# Patient Record
Sex: MALE | Race: WHITE | URBAN - METROPOLITAN AREA
[De-identification: names, ages, dates, MRNs, and addresses within clinical notes are randomized per-mention and may not be internally consistent; named-entity substitution may affect disease eponyms.]

---

## 2019-06-12 ENCOUNTER — HOSPITAL ENCOUNTER (OUTPATIENT)
Dept: INFUSION THERAPY | Age: 69
Discharge: HOME | End: 2019-06-12
Payer: MEDICARE

## 2019-06-12 VITALS
RESPIRATION RATE: 18 BRPM | TEMPERATURE: 97.6 F | HEART RATE: 83 BPM | DIASTOLIC BLOOD PRESSURE: 73 MMHG | SYSTOLIC BLOOD PRESSURE: 169 MMHG

## 2019-06-12 PROCEDURE — 74011250636 HC RX REV CODE- 250/636: Performed by: NURSE PRACTITIONER

## 2019-06-12 PROCEDURE — 96365 THER/PROPH/DIAG IV INF INIT: CPT

## 2019-06-12 PROCEDURE — 74011000258 HC RX REV CODE- 258: Performed by: NURSE PRACTITIONER

## 2019-06-12 PROCEDURE — 74011250636 HC RX REV CODE- 250/636

## 2019-06-12 RX ORDER — HEPARIN 100 UNIT/ML
500 SYRINGE INTRAVENOUS AS NEEDED
Status: DISCONTINUED | OUTPATIENT
Start: 2019-06-12 | End: 2019-06-13 | Stop reason: HOSPADM

## 2019-06-12 RX ORDER — AMLODIPINE BESYLATE 5 MG/1
5 TABLET ORAL DAILY
COMMUNITY

## 2019-06-12 RX ORDER — SIMVASTATIN 20 MG/1
TABLET, FILM COATED ORAL
COMMUNITY

## 2019-06-12 RX ORDER — SODIUM CHLORIDE 0.9 % (FLUSH) 0.9 %
10 SYRINGE (ML) INJECTION AS NEEDED
Status: DISCONTINUED | OUTPATIENT
Start: 2019-06-12 | End: 2019-06-13 | Stop reason: HOSPADM

## 2019-06-12 RX ORDER — CYCLOSPORINE 100 MG/1
CAPSULE, GELATIN COATED ORAL 3 TIMES DAILY
COMMUNITY

## 2019-06-12 RX ORDER — IBUPROFEN 200 MG
630 CAPSULE ORAL DAILY
COMMUNITY

## 2019-06-12 RX ORDER — MYCOPHENOLATE MOFETIL 500 MG/1
750 TABLET ORAL 2 TIMES DAILY
COMMUNITY

## 2019-06-12 RX ORDER — ALLOPURINOL 300 MG/1
TABLET ORAL DAILY
COMMUNITY

## 2019-06-12 RX ORDER — LEVOTHYROXINE SODIUM 137 UG/1
TABLET ORAL
COMMUNITY

## 2019-06-12 RX ORDER — PREDNISONE 5 MG/1
2.5 TABLET ORAL 3 TIMES DAILY
COMMUNITY

## 2019-06-12 RX ORDER — ZINC GLUCONATE 10 MG
400 LOZENGE ORAL
COMMUNITY

## 2019-06-12 RX ORDER — ATENOLOL 100 MG/1
100 TABLET ORAL DAILY
COMMUNITY

## 2019-06-12 RX ADMIN — SODIUM CHLORIDE 1 G: 900 INJECTION, SOLUTION INTRAVENOUS at 12:03

## 2019-06-12 RX ADMIN — Medication 10 ML: at 12:04

## 2019-06-12 RX ADMIN — Medication 10 ML: at 12:35

## 2019-06-12 RX ADMIN — Medication 500 UNITS: at 12:04

## 2019-06-12 RX ADMIN — SODIUM CHLORIDE, PRESERVATIVE FREE 500 UNITS: 5 INJECTION INTRAVENOUS at 12:35

## 2019-06-12 NOTE — PROGRESS NOTES
[facility] Progress Note    Date: 2019    Name: Trisha Vásquez    MRN: [id number]         : 1950    Mr. Vaishali Smith Arrived ambulatory and in no distress for Invanz regimen. Assessment was completed, no acute issues at this time, no new complaints voiced. PICC line flushed, purple and red lumen, + blood return. Mr. Chiki Davis vitals were reviewed. Patient Vitals for the past 12 hrs:   Temp Pulse Resp BP   19 1136 97.6 °F (36.4 °C) 83 18 169/73     Medications:    Mr. Vaishali Smith tolerated treatment well and was discharged from [place] [address] in stable condition. PICC line flushed & heparinized per protocol. He is to return on 19 at 1:00 for his next appointment.     James Vidal RN  2019

## 2019-06-13 ENCOUNTER — HOSPITAL ENCOUNTER (OUTPATIENT)
Dept: INFUSION THERAPY | Age: 69
Discharge: HOME | End: 2019-06-13
Payer: MEDICARE

## 2019-06-13 VITALS
DIASTOLIC BLOOD PRESSURE: 65 MMHG | SYSTOLIC BLOOD PRESSURE: 139 MMHG | TEMPERATURE: 98 F | HEART RATE: 98 BPM | RESPIRATION RATE: 16 BRPM

## 2019-06-13 PROCEDURE — 96365 THER/PROPH/DIAG IV INF INIT: CPT

## 2019-06-13 PROCEDURE — 74011250636 HC RX REV CODE- 250/636: Performed by: INTERNAL MEDICINE

## 2019-06-13 PROCEDURE — 74011000258 HC RX REV CODE- 258: Performed by: NURSE PRACTITIONER

## 2019-06-13 PROCEDURE — 74011250636 HC RX REV CODE- 250/636: Performed by: NURSE PRACTITIONER

## 2019-06-13 RX ORDER — HEPARIN 100 UNIT/ML
500 SYRINGE INTRAVENOUS AS NEEDED
Status: DISCONTINUED | OUTPATIENT
Start: 2019-06-13 | End: 2019-06-14 | Stop reason: HOSPADM

## 2019-06-13 RX ORDER — SODIUM CHLORIDE 0.9 % (FLUSH) 0.9 %
5-10 SYRINGE (ML) INJECTION AS NEEDED
Status: DISCONTINUED | OUTPATIENT
Start: 2019-06-13 | End: 2019-06-14 | Stop reason: HOSPADM

## 2019-06-13 RX ADMIN — Medication 10 ML: at 13:30

## 2019-06-13 RX ADMIN — Medication 10 ML: at 13:31

## 2019-06-13 RX ADMIN — Medication 500 UNITS: at 13:30

## 2019-06-13 RX ADMIN — Medication 500 UNITS: at 13:31

## 2019-06-13 RX ADMIN — SODIUM CHLORIDE 1 G: 900 INJECTION, SOLUTION INTRAVENOUS at 13:00

## 2019-06-13 NOTE — PROGRESS NOTES
Outpatient Infusion Center Progress Note    8616  Pt admit to [facility] [place] for Ertapenem regimen ambulatory in stable condition. Assessment completed. No new concerns voiced. PICC line flushed, purple and red lumen and positive blood return noted. Visit Vitals  /65   Pulse 98   Temp 98 °F (36.7 °C)   Resp 16     Medications Administered     ertapenem (INVANZ) 1 g in 0.9% sodium chloride (MBP/ADV) 50 mL     Admin Date  06/13/2019 Action  New Bag Dose  1 g Rate  100 mL/hr Route  IntraVENous Administered By  Ciara Broderick RN          heparin (porcine) pf 500 Units     Admin Date  06/13/2019 Action  Given Dose  500 Units Route  IntraVENous Administered By  Ciara Broderick RN           Admin Date  06/13/2019 Action  Given Dose  500 Units Route  IntraVENous Administered By  Ciara Broderick RN          sodium chloride (NS) flush 5-10 mL     Admin Date  06/13/2019 Action  Given Dose  10 mL Route  IntraVENous Administered By  Ciara Broderick RN           Admin Date  06/13/2019 Action  Given Dose  10 mL Route  IntraVENous Administered By  Ciara Broderick RN                    3137 Pt tolerated treatment well. D/c home ambulatory in no distress. PICC line flushed with saline and heparin per protocol and curos caps applied.  Pt aware of next appointment scheduled for 6/14/19 at 3pm.

## 2019-06-14 ENCOUNTER — HOSPITAL ENCOUNTER (OUTPATIENT)
Dept: INFUSION THERAPY | Age: 69
Discharge: HOME | End: 2019-06-14
Payer: MEDICARE

## 2019-06-14 VITALS
DIASTOLIC BLOOD PRESSURE: 65 MMHG | RESPIRATION RATE: 16 BRPM | TEMPERATURE: 98.1 F | HEART RATE: 107 BPM | SYSTOLIC BLOOD PRESSURE: 141 MMHG

## 2019-06-14 PROCEDURE — 74011250636 HC RX REV CODE- 250/636: Performed by: NURSE PRACTITIONER

## 2019-06-14 PROCEDURE — 96365 THER/PROPH/DIAG IV INF INIT: CPT

## 2019-06-14 PROCEDURE — 77030020847 HC STATLOK BARD -A

## 2019-06-14 PROCEDURE — 74011000258 HC RX REV CODE- 258: Performed by: NURSE PRACTITIONER

## 2019-06-14 RX ORDER — SODIUM CHLORIDE 0.9 % (FLUSH) 0.9 %
5-10 SYRINGE (ML) INJECTION AS NEEDED
Status: DISCONTINUED | OUTPATIENT
Start: 2019-06-14 | End: 2019-06-15 | Stop reason: HOSPADM

## 2019-06-14 RX ORDER — HEPARIN 100 UNIT/ML
500 SYRINGE INTRAVENOUS AS NEEDED
Status: DISCONTINUED | OUTPATIENT
Start: 2019-06-14 | End: 2019-06-15 | Stop reason: HOSPADM

## 2019-06-14 RX ADMIN — Medication 500 UNITS: at 15:07

## 2019-06-14 RX ADMIN — Medication 10 ML: at 14:32

## 2019-06-14 RX ADMIN — Medication 10 ML: at 15:07

## 2019-06-14 RX ADMIN — SODIUM CHLORIDE 1 G: 900 INJECTION, SOLUTION INTRAVENOUS at 14:36

## 2019-06-14 NOTE — PROGRESS NOTES
Outpatient Infusion Center - Chemotherapy Progress Note    1430 Pt admit to [facility] for Ertapenem regimen/PICC line dressing change ambulatory in stable condition. Patient complains of slight nausea with dinner last night. Right arm PICC line accessed with positive blood return. Ertapenem running through purple lumen. Patient Vitals for the past 12 hrs:   Temp Pulse Resp BP   06/14/19 1431 98.1 °F (36.7 °C) (!) 107 16 141/65       Medications:  IV Ertapenem    1520 Pt tolerated treatment well. Right arm PICC line maintained positive blood return throughout treatment, flushed with saline and heparin per protocol. PICC dressing changed. D/c home ambulatory in no distress. Pt aware of next appointment scheduled for 6/15/19 at [address] [place] [address] at [address].

## 2019-06-15 ENCOUNTER — HOSPITAL ENCOUNTER (OUTPATIENT)
Dept: INFUSION THERAPY | Age: 69
Discharge: HOME | End: 2019-06-15
Payer: MEDICARE

## 2019-06-15 VITALS
HEART RATE: 112 BPM | SYSTOLIC BLOOD PRESSURE: 118 MMHG | RESPIRATION RATE: 16 BRPM | TEMPERATURE: 97.6 F | OXYGEN SATURATION: 100 % | DIASTOLIC BLOOD PRESSURE: 60 MMHG

## 2019-06-15 PROCEDURE — 74011000258 HC RX REV CODE- 258: Performed by: NURSE PRACTITIONER

## 2019-06-15 PROCEDURE — 74011250636 HC RX REV CODE- 250/636

## 2019-06-15 PROCEDURE — 96365 THER/PROPH/DIAG IV INF INIT: CPT

## 2019-06-15 PROCEDURE — 74011250636 HC RX REV CODE- 250/636: Performed by: NURSE PRACTITIONER

## 2019-06-15 RX ORDER — HEPARIN 100 UNIT/ML
500 SYRINGE INTRAVENOUS AS NEEDED
Status: DISCONTINUED | OUTPATIENT
Start: 2019-06-15 | End: 2019-06-16 | Stop reason: HOSPADM

## 2019-06-15 RX ORDER — SODIUM CHLORIDE 0.9 % (FLUSH) 0.9 %
10-40 SYRINGE (ML) INJECTION AS NEEDED
Status: DISCONTINUED | OUTPATIENT
Start: 2019-06-15 | End: 2019-06-16 | Stop reason: HOSPADM

## 2019-06-15 RX ADMIN — SODIUM CHLORIDE 1 G: 900 INJECTION, SOLUTION INTRAVENOUS at 08:51

## 2019-06-15 RX ADMIN — Medication 500 UNITS: at 09:25

## 2019-06-15 RX ADMIN — Medication 10 ML: at 09:25

## 2019-06-15 NOTE — PROGRESS NOTES
0845: Pt arrived at [facility] ambulatory and in no distress for Invanz 1g IV. Assessment completed, no new complaints voiced. Right double lumen picc with + blood return. Visit Vitals  /60   Pulse (!) 112   Temp 97.6 °F (36.4 °C)   Resp 16   SpO2 100%       Medications received:  Invanz 1g IV over 30 mins    0930 Tolerated treatment well, no adverse reaction noted. D/Cd from [facility] ambulatory and in no distress accompanied by wife.   Next appt 9/16/19 at 830am

## 2019-06-16 ENCOUNTER — HOSPITAL ENCOUNTER (OUTPATIENT)
Dept: INFUSION THERAPY | Age: 69
Discharge: HOME | End: 2019-06-16
Payer: MEDICARE

## 2019-06-16 VITALS
DIASTOLIC BLOOD PRESSURE: 66 MMHG | HEART RATE: 80 BPM | SYSTOLIC BLOOD PRESSURE: 128 MMHG | TEMPERATURE: 97.1 F | RESPIRATION RATE: 18 BRPM | OXYGEN SATURATION: 100 %

## 2019-06-16 PROCEDURE — 74011250636 HC RX REV CODE- 250/636

## 2019-06-16 PROCEDURE — 96365 THER/PROPH/DIAG IV INF INIT: CPT

## 2019-06-16 PROCEDURE — 74011250636 HC RX REV CODE- 250/636: Performed by: NURSE PRACTITIONER

## 2019-06-16 PROCEDURE — 74011000258 HC RX REV CODE- 258: Performed by: NURSE PRACTITIONER

## 2019-06-16 RX ORDER — SODIUM CHLORIDE 0.9 % (FLUSH) 0.9 %
5-10 SYRINGE (ML) INJECTION AS NEEDED
Status: DISCONTINUED | OUTPATIENT
Start: 2019-06-16 | End: 2019-06-17 | Stop reason: HOSPADM

## 2019-06-16 RX ORDER — HEPARIN 100 UNIT/ML
500 SYRINGE INTRAVENOUS AS NEEDED
Status: DISCONTINUED | OUTPATIENT
Start: 2019-06-16 | End: 2019-06-17 | Stop reason: HOSPADM

## 2019-06-16 RX ADMIN — SODIUM CHLORIDE, PRESERVATIVE FREE 500 UNITS: 5 INJECTION INTRAVENOUS at 08:28

## 2019-06-16 RX ADMIN — SODIUM CHLORIDE 1 G: 900 INJECTION, SOLUTION INTRAVENOUS at 08:25

## 2019-06-16 RX ADMIN — Medication 10 ML: at 08:28

## 2019-06-16 RX ADMIN — Medication 10 ML: at 08:25

## 2019-06-16 RX ADMIN — Medication 10 ML: at 08:56

## 2019-06-16 RX ADMIN — Medication 10 ML: at 08:55

## 2019-06-16 RX ADMIN — SODIUM CHLORIDE, PRESERVATIVE FREE 500 UNITS: 5 INJECTION INTRAVENOUS at 08:56

## 2019-06-16 NOTE — PROGRESS NOTES
Outpatient Infusion Center Progress Note    0820  Pt admit to [facility] for daily antibiotics ambulatory in stable condition. Assessment completed. No new concerns voiced. Visit Vitals  /66   Pulse 80   Temp 97.1 °F (36.2 °C)   Resp 18   SpO2 100%     Right arm DL PICC line flushed with positive blood return in each lumen. Medications:  Invanz IV    0900  Pt tolerated treatment well. PICC flushed and secured for use at tomorrow's appt. D/c home ambulatory in no distress.  Pt aware of next appointment scheduled for 6/17/19 at 1 pm.

## 2019-06-17 ENCOUNTER — HOSPITAL ENCOUNTER (OUTPATIENT)
Dept: INFUSION THERAPY | Age: 69
Discharge: HOME | End: 2019-06-17
Payer: MEDICARE

## 2019-06-17 VITALS
TEMPERATURE: 98 F | SYSTOLIC BLOOD PRESSURE: 128 MMHG | DIASTOLIC BLOOD PRESSURE: 59 MMHG | HEART RATE: 76 BPM | RESPIRATION RATE: 18 BRPM

## 2019-06-17 PROCEDURE — 96365 THER/PROPH/DIAG IV INF INIT: CPT

## 2019-06-17 PROCEDURE — 74011250636 HC RX REV CODE- 250/636: Performed by: NURSE PRACTITIONER

## 2019-06-17 PROCEDURE — 74011000258 HC RX REV CODE- 258: Performed by: NURSE PRACTITIONER

## 2019-06-17 RX ORDER — SODIUM CHLORIDE 0.9 % (FLUSH) 0.9 %
5-10 SYRINGE (ML) INJECTION AS NEEDED
Status: DISCONTINUED | OUTPATIENT
Start: 2019-06-17 | End: 2019-06-18 | Stop reason: HOSPADM

## 2019-06-17 RX ORDER — HEPARIN SODIUM (PORCINE) LOCK FLUSH IV SOLN 100 UNIT/ML 100 UNIT/ML
500 SOLUTION INTRAVENOUS AS NEEDED
Status: DISCONTINUED | OUTPATIENT
Start: 2019-06-17 | End: 2019-06-18 | Stop reason: HOSPADM

## 2019-06-17 RX ADMIN — Medication 10 ML: at 12:46

## 2019-06-17 RX ADMIN — SODIUM CHLORIDE 1 G: 900 INJECTION, SOLUTION INTRAVENOUS at 12:47

## 2019-06-17 NOTE — PROGRESS NOTES
Outpatient Infusion Center Progress Note    1134 Pt admit to [facility] for daily antibiotics ambulatory in stable condition. Assessment completed. No new concerns voiced. PICC flushed with positive blood from both lumens. Visit Vitals  /59   Pulse 76   Temp 98 °F (36.7 °C)   Resp 18       Medications:  Invanz    1325 Pt tolerated treatment well. D/c home ambulatory in no distress. Pt aware of next appointment scheduled for 6/18/19.

## 2019-06-18 ENCOUNTER — HOSPITAL ENCOUNTER (OUTPATIENT)
Dept: INFUSION THERAPY | Age: 69
Discharge: HOME | End: 2019-06-18
Payer: MEDICARE

## 2019-06-18 VITALS
HEART RATE: 74 BPM | RESPIRATION RATE: 18 BRPM | SYSTOLIC BLOOD PRESSURE: 107 MMHG | DIASTOLIC BLOOD PRESSURE: 60 MMHG | TEMPERATURE: 97.4 F

## 2019-06-18 PROCEDURE — 74011250636 HC RX REV CODE- 250/636: Performed by: NURSE PRACTITIONER

## 2019-06-18 PROCEDURE — 96365 THER/PROPH/DIAG IV INF INIT: CPT

## 2019-06-18 PROCEDURE — 74011000258 HC RX REV CODE- 258: Performed by: NURSE PRACTITIONER

## 2019-06-18 RX ADMIN — SODIUM CHLORIDE 1 G: 900 INJECTION, SOLUTION INTRAVENOUS at 13:05

## 2019-06-19 ENCOUNTER — HOSPITAL ENCOUNTER (OUTPATIENT)
Dept: INFUSION THERAPY | Age: 69
Discharge: HOME | End: 2019-06-19
Payer: MEDICARE

## 2019-06-19 VITALS
TEMPERATURE: 98.5 F | HEART RATE: 78 BPM | SYSTOLIC BLOOD PRESSURE: 121 MMHG | DIASTOLIC BLOOD PRESSURE: 62 MMHG | RESPIRATION RATE: 18 BRPM

## 2019-06-19 PROCEDURE — 74011000258 HC RX REV CODE- 258: Performed by: NURSE PRACTITIONER

## 2019-06-19 PROCEDURE — 74011250636 HC RX REV CODE- 250/636: Performed by: INTERNAL MEDICINE

## 2019-06-19 PROCEDURE — 74011250636 HC RX REV CODE- 250/636: Performed by: NURSE PRACTITIONER

## 2019-06-19 PROCEDURE — 96365 THER/PROPH/DIAG IV INF INIT: CPT

## 2019-06-19 RX ORDER — SODIUM CHLORIDE 0.9 % (FLUSH) 0.9 %
5-10 SYRINGE (ML) INJECTION AS NEEDED
Status: DISCONTINUED | OUTPATIENT
Start: 2019-06-19 | End: 2019-06-20 | Stop reason: HOSPADM

## 2019-06-19 RX ORDER — HEPARIN 100 UNIT/ML
500 SYRINGE INTRAVENOUS AS NEEDED
Status: DISCONTINUED | OUTPATIENT
Start: 2019-06-19 | End: 2019-06-20 | Stop reason: HOSPADM

## 2019-06-19 RX ADMIN — SODIUM CHLORIDE 1 G: 900 INJECTION, SOLUTION INTRAVENOUS at 15:06

## 2019-06-19 RX ADMIN — Medication 500 UNITS: at 15:07

## 2019-06-19 RX ADMIN — Medication 10 ML: at 15:07

## 2019-06-19 NOTE — PROGRESS NOTES
[facility] Progress Note    Date: 2019    Name: Sherron Prader    MRN: [id number]         : 1950    1500. Mr. Maurice Carrera Arrived ambulatory and in no distress for Invanz. Assessment was completed, no acute issues at this time, no new complaints voiced. RUE PICC CDI, + blood return to both lumens. Patient Vitals for the past 12 hrs:   Temp Pulse Resp BP   19 1502 98.5 °F (36.9 °C) 78 18 121/62     Medications:  Invanz IV    1540. Mr. Maurice Carrera tolerated treatment well and was discharged from [place] [address] in stable condition. He is to return on 19 for his next appointment.     Taylor Avitia RN  2019

## 2019-06-20 ENCOUNTER — HOSPITAL ENCOUNTER (OUTPATIENT)
Dept: INFUSION THERAPY | Age: 69
Discharge: HOME | End: 2019-06-20
Payer: MEDICARE

## 2019-06-20 VITALS
DIASTOLIC BLOOD PRESSURE: 67 MMHG | TEMPERATURE: 98 F | RESPIRATION RATE: 18 BRPM | SYSTOLIC BLOOD PRESSURE: 126 MMHG | HEART RATE: 72 BPM

## 2019-06-20 PROCEDURE — 74011250636 HC RX REV CODE- 250/636: Performed by: NURSE PRACTITIONER

## 2019-06-20 PROCEDURE — 74011000258 HC RX REV CODE- 258: Performed by: NURSE PRACTITIONER

## 2019-06-20 PROCEDURE — 96365 THER/PROPH/DIAG IV INF INIT: CPT

## 2019-06-20 RX ADMIN — SODIUM CHLORIDE 1 G: 900 INJECTION, SOLUTION INTRAVENOUS at 14:05

## 2019-06-20 NOTE — PROGRESS NOTES
Outpatient Infusion Center Short Visit Progress Note     Patient admitted to [facility] for daily antibiotic ambulatory in stable condition. Assessment completed. No new concerns voiced. Patient Vitals for the past 12 hrs:   Temp Pulse Resp BP   06/20/19 1358 98.7 °F (37.1 °C) 83 18 108/55     R arm dual lume PICCwith positive blood return. Medications:  Invanz IV    Patient tolerated treatment well. Patient discharged from [place] [address] ambulatory in no distress. Patient aware of next appointment.     Junie Cowart, RN    Future Appointments   Date Time Provider Zac Jennings   6/20/2019  2:00 PM [facility]   6/21/2019  1:00 PM [facility]   6/22/2019  9:00 [facility]   6/23/2019  9:00 AM BREMO INFUSION NURSE 2 [facility]   6/24/2019  1:00 PM [facility]   6/25/2019 11:00 AM [facility]   6/26/2019  3:00 PM [facility]

## 2019-06-21 ENCOUNTER — HOSPITAL ENCOUNTER (OUTPATIENT)
Dept: INFUSION THERAPY | Age: 69
Discharge: HOME | End: 2019-06-21
Payer: MEDICARE

## 2019-06-21 VITALS
SYSTOLIC BLOOD PRESSURE: 99 MMHG | DIASTOLIC BLOOD PRESSURE: 55 MMHG | HEART RATE: 80 BPM | TEMPERATURE: 98.1 F | RESPIRATION RATE: 18 BRPM

## 2019-06-21 PROCEDURE — 74011250636 HC RX REV CODE- 250/636: Performed by: NURSE PRACTITIONER

## 2019-06-21 PROCEDURE — 96365 THER/PROPH/DIAG IV INF INIT: CPT

## 2019-06-21 PROCEDURE — 74011000258 HC RX REV CODE- 258: Performed by: NURSE PRACTITIONER

## 2019-06-21 RX ADMIN — SODIUM CHLORIDE 1 G: 900 INJECTION, SOLUTION INTRAVENOUS at 13:00

## 2019-06-21 NOTE — PROGRESS NOTES
[facility] VISIT NOTE    Pt arrived at [address] [place] [address] and in no distress for daily antibiotics. Assessment completed, pt  Had no complaints. Patient Vitals for the past 12 hrs:   Temp Pulse Resp BP   06/21/19 1255 98.1 °F (36.7 °C) 80 18 99/55     Double lumen right arm PICC line flushed per protocol. Good blood return both lumens. Medications received:  Ertapenem IV    Tolerated treatment well, no adverse reaction noted. PICC line flushed and new end caps and curos cap placed. D/C'd from [address] [place] [address] and in no distress. Next appointment is 6/22/19 at [facility].

## 2019-06-22 ENCOUNTER — HOSPITAL ENCOUNTER (OUTPATIENT)
Dept: INFUSION THERAPY | Age: 69
Discharge: HOME | End: 2019-06-22
Payer: MEDICARE

## 2019-06-22 VITALS
DIASTOLIC BLOOD PRESSURE: 66 MMHG | RESPIRATION RATE: 16 BRPM | TEMPERATURE: 97.7 F | SYSTOLIC BLOOD PRESSURE: 119 MMHG | HEART RATE: 68 BPM

## 2019-06-22 PROCEDURE — 74011000258 HC RX REV CODE- 258: Performed by: INTERNAL MEDICINE

## 2019-06-22 PROCEDURE — 96365 THER/PROPH/DIAG IV INF INIT: CPT

## 2019-06-22 PROCEDURE — 74011250636 HC RX REV CODE- 250/636: Performed by: INTERNAL MEDICINE

## 2019-06-22 RX ORDER — SODIUM CHLORIDE 0.9 % (FLUSH) 0.9 %
5-10 SYRINGE (ML) INJECTION AS NEEDED
Status: DISCONTINUED | OUTPATIENT
Start: 2019-06-22 | End: 2019-06-23 | Stop reason: HOSPADM

## 2019-06-22 RX ORDER — HEPARIN 100 UNIT/ML
500 SYRINGE INTRAVENOUS AS NEEDED
Status: DISCONTINUED | OUTPATIENT
Start: 2019-06-22 | End: 2019-06-23 | Stop reason: HOSPADM

## 2019-06-22 RX ADMIN — SODIUM CHLORIDE 1 G: 900 INJECTION, SOLUTION INTRAVENOUS at 08:48

## 2019-06-22 NOTE — PROGRESS NOTES
[address] [facility] @ [facility] VISIT NOTE    5785 Patient arrives for Ertapenem Daily without acute problems. Please see connect [facility] for complete assessment and education provided. All central lines follow the [facility]. Vital signs stable throughout and prior to discharge, Pt. Tolerated treatment well and discharged without incident. Patient/parent is aware of next [facility] appointment on 6/23/2019. Appointment card given to patient/parents. Medications Verified by Fang Branham RN via [facility]:  1. Ertapenem 1 gram  2.  Heparin 500 units    VITAL SIGNS   Patient Vitals for the past 12 hrs:   Temp Pulse Resp BP   06/22/19 0848 97.7 °F (36.5 °C) 68 16 119/66

## 2019-06-23 ENCOUNTER — HOSPITAL ENCOUNTER (OUTPATIENT)
Dept: INFUSION THERAPY | Age: 69
Discharge: HOME | End: 2019-06-23
Payer: MEDICARE

## 2019-06-23 VITALS
TEMPERATURE: 97.8 F | SYSTOLIC BLOOD PRESSURE: 130 MMHG | DIASTOLIC BLOOD PRESSURE: 69 MMHG | RESPIRATION RATE: 16 BRPM | HEART RATE: 76 BPM

## 2019-06-23 PROCEDURE — 74011250636 HC RX REV CODE- 250/636: Performed by: INTERNAL MEDICINE

## 2019-06-23 PROCEDURE — 74011000258 HC RX REV CODE- 258: Performed by: INTERNAL MEDICINE

## 2019-06-23 PROCEDURE — 96365 THER/PROPH/DIAG IV INF INIT: CPT

## 2019-06-23 RX ORDER — HEPARIN 100 UNIT/ML
500 SYRINGE INTRAVENOUS AS NEEDED
Status: DISCONTINUED | OUTPATIENT
Start: 2019-06-23 | End: 2019-06-24 | Stop reason: HOSPADM

## 2019-06-23 RX ORDER — SODIUM CHLORIDE 0.9 % (FLUSH) 0.9 %
5-10 SYRINGE (ML) INJECTION AS NEEDED
Status: DISCONTINUED | OUTPATIENT
Start: 2019-06-23 | End: 2019-06-24 | Stop reason: HOSPADM

## 2019-06-23 RX ADMIN — SODIUM CHLORIDE 1 G: 900 INJECTION, SOLUTION INTRAVENOUS at 08:54

## 2019-06-23 RX ADMIN — Medication 10 ML: at 08:51

## 2019-06-23 RX ADMIN — Medication 500 UNITS: at 09:22

## 2019-06-23 NOTE — PROGRESS NOTES
Outpatient Infusion Center Short Visit Progress Note    8996 Patient admitted to [facility] for [facility] in stable condition. Assessment completed. No new concerns voiced. Visit Vitals  /69 (BP 1 Location: Left arm, BP Patient Position: Sitting)   Pulse 76   Temp 97.8 °F (36.6 °C)   Resp 16     Right arm double lumen PICC flushed; with positive blood return in both ports. Dressing remains clean and intact. Medications:  Medications Administered     ertapenem (INVANZ) 1 g in 0.9% sodium chloride (MBP/ADV) 50 mL     Admin Date  06/23/2019 Action  New Bag Dose  1 g Rate  100 mL/hr Route  IntraVENous Administered By  Frederick GAUTHIER          heparin (porcine) pf 500 Units     Admin Date  06/23/2019 Action  Given Dose  500 Units Route  IntraVENous Administered By  Frederick GAUTHIER          sodium chloride (NS) flush 5-10 mL     Admin Date  06/23/2019 Action  Given Dose  10 mL Route  IntraVENous Administered By  Damien Rueda                  Patient tolerated treatment well. Patient discharged from [place] [address] ambulatory in no distress at 2387. Patient aware of next appointment.     Future Appointments   Date Time Provider Zac Jennings   6/23/2019  9:00 AM SREE INFUSION NURSE 2 [facility]   6/24/2019  1:00 PM SS INF7 CH3 <1H [facility]  DANGELO   6/25/2019 11:00 AM SS INF7 CH3 <1H [facility]

## 2019-06-24 ENCOUNTER — HOSPITAL ENCOUNTER (OUTPATIENT)
Dept: INFUSION THERAPY | Age: 69
Discharge: HOME | End: 2019-06-24
Payer: MEDICARE

## 2019-06-24 VITALS
RESPIRATION RATE: 16 BRPM | HEART RATE: 76 BPM | TEMPERATURE: 97.5 F | SYSTOLIC BLOOD PRESSURE: 135 MMHG | DIASTOLIC BLOOD PRESSURE: 76 MMHG | OXYGEN SATURATION: 100 %

## 2019-06-24 PROCEDURE — 74011250636 HC RX REV CODE- 250/636: Performed by: NURSE PRACTITIONER

## 2019-06-24 PROCEDURE — 77030020847 HC STATLOK BARD -A

## 2019-06-24 PROCEDURE — 96365 THER/PROPH/DIAG IV INF INIT: CPT

## 2019-06-24 PROCEDURE — 74011000258 HC RX REV CODE- 258: Performed by: NURSE PRACTITIONER

## 2019-06-24 PROCEDURE — 74011250636 HC RX REV CODE- 250/636: Performed by: INTERNAL MEDICINE

## 2019-06-24 RX ORDER — SODIUM CHLORIDE 0.9 % (FLUSH) 0.9 %
5-10 SYRINGE (ML) INJECTION AS NEEDED
Status: DISCONTINUED | OUTPATIENT
Start: 2019-06-24 | End: 2019-06-25 | Stop reason: HOSPADM

## 2019-06-24 RX ORDER — HEPARIN 100 UNIT/ML
500 SYRINGE INTRAVENOUS AS NEEDED
Status: DISCONTINUED | OUTPATIENT
Start: 2019-06-24 | End: 2019-06-25 | Stop reason: HOSPADM

## 2019-06-24 RX ADMIN — Medication 10 ML: at 13:44

## 2019-06-24 RX ADMIN — Medication 10 ML: at 13:12

## 2019-06-24 RX ADMIN — Medication 10 ML: at 13:43

## 2019-06-24 RX ADMIN — SODIUM CHLORIDE 1 G: 900 INJECTION, SOLUTION INTRAVENOUS at 13:07

## 2019-06-24 RX ADMIN — Medication 500 UNITS: at 13:43

## 2019-06-24 RX ADMIN — Medication 500 UNITS: at 13:45

## 2019-06-25 ENCOUNTER — HOSPITAL ENCOUNTER (OUTPATIENT)
Dept: INFUSION THERAPY | Age: 69
Discharge: HOME | End: 2019-06-25
Payer: MEDICARE

## 2019-06-25 VITALS
RESPIRATION RATE: 18 BRPM | SYSTOLIC BLOOD PRESSURE: 110 MMHG | DIASTOLIC BLOOD PRESSURE: 63 MMHG | HEART RATE: 81 BPM | TEMPERATURE: 98 F

## 2019-06-25 PROCEDURE — 96365 THER/PROPH/DIAG IV INF INIT: CPT

## 2019-06-25 PROCEDURE — 74011250636 HC RX REV CODE- 250/636: Performed by: NURSE PRACTITIONER

## 2019-06-25 PROCEDURE — 74011000258 HC RX REV CODE- 258: Performed by: NURSE PRACTITIONER

## 2019-06-25 PROCEDURE — 74011250636 HC RX REV CODE- 250/636

## 2019-06-25 RX ORDER — HEPARIN 100 UNIT/ML
500 SYRINGE INTRAVENOUS AS NEEDED
Status: DISCONTINUED | OUTPATIENT
Start: 2019-06-25 | End: 2019-06-26 | Stop reason: HOSPADM

## 2019-06-25 RX ORDER — SODIUM CHLORIDE 0.9 % (FLUSH) 0.9 %
10 SYRINGE (ML) INJECTION AS NEEDED
Status: DISCONTINUED | OUTPATIENT
Start: 2019-06-25 | End: 2019-06-26 | Stop reason: HOSPADM

## 2019-06-25 RX ADMIN — Medication 10 ML: at 10:50

## 2019-06-25 RX ADMIN — Medication 10 ML: at 11:25

## 2019-06-25 RX ADMIN — SODIUM CHLORIDE 1 G: 900 INJECTION, SOLUTION INTRAVENOUS at 10:55

## 2019-06-25 RX ADMIN — Medication 500 UNITS: at 11:25

## 2019-06-25 RX ADMIN — Medication 500 UNITS: at 10:50

## 2019-06-26 ENCOUNTER — HOSPITAL ENCOUNTER (OUTPATIENT)
Dept: INFUSION THERAPY | Age: 69
End: 2019-06-26
Payer: MEDICARE

## 2019-07-01 ENCOUNTER — HOSPITAL ENCOUNTER (OUTPATIENT)
Dept: INFUSION THERAPY | Age: 69
Discharge: HOME | End: 2019-07-01

## 2019-07-01 NOTE — PROGRESS NOTES
Patient called to cancel appointment today to remove PICC line. RN at [facility] notified.

## 2019-07-08 ENCOUNTER — HOSPITAL ENCOUNTER (OUTPATIENT)
Dept: INFUSION THERAPY | Age: 69
End: 2019-07-08

## 2019-07-08 ENCOUNTER — HOSPITAL ENCOUNTER (OUTPATIENT)
Dept: INFUSION THERAPY | Age: 69
Discharge: HOME | End: 2019-07-08

## 2019-07-08 VITALS
TEMPERATURE: 98.3 F | HEART RATE: 96 BPM | RESPIRATION RATE: 18 BRPM | DIASTOLIC BLOOD PRESSURE: 64 MMHG | SYSTOLIC BLOOD PRESSURE: 106 MMHG

## 2019-07-08 RX ORDER — BACITRACIN 500 UNIT/G
PACKET (EA) TOPICAL ONCE
Status: DISCONTINUED | OUTPATIENT
Start: 2019-07-08 | End: 2019-07-09 | Stop reason: HOSPADM

## 2019-07-08 RX ORDER — BACITRACIN ZINC 500 UNIT/G
OINTMENT (GRAM) TOPICAL ONCE
Status: DISCONTINUED | OUTPATIENT
Start: 2019-07-08 | End: 2019-07-08